# Patient Record
(demographics unavailable — no encounter records)

---

## 2024-11-04 NOTE — PHYSICAL EXAM
[Acute Distress] : acute distress [Alert] : alert [Toxic appearing] : well appearing [Moves all extremities x4] : moves all extremities x4 [Warm, well perfused x4] : warm, well perfused x4 [Capillary refill < 2s] : Capillary refill < 2s [NL] : grossly intact [TextBox_37] : Soft, non-tender, non-distended, with positive bowel sounds.  There are no masses and no organomegaly.  Left groin- area with  black shawn is darker than surrounding area but not really raised and not infected nor inflamed. Some induration but no formal distinct lesion nor cyst is noted. Induration is probably post infection. No leg issue and lesion is high on the inguinal ligament. Nothing on left groin and shoddy adenopathy.bilateral groins.

## 2024-11-04 NOTE — CONSULT LETTER
[Dear  ___] : Dear  [unfilled], [Please see my note below.] : Please see my note below. [FreeTextEntry1] : I had the pleasure of seeing YULI QUARLES in my office on Nov 04, 2024 Thank you very much for letting me participate in YULI QUARLES 's care and I will keep you informed of his progress. Sincerely, Mendel Patel M.D.

## 2024-11-04 NOTE — HISTORY OF PRESENT ILLNESS
[FreeTextEntry1] : Carlos A Sr is a 1 y/o male with a cc/ of a left groin abscess now healing that his pediatrician wanted to have evaluated.  Pt had a left groin abscess appear around a month ago which was treated with antibiotics and had spontaneously drained.  Now much better with no pain, inflammation, but still a little swelling and induration.  The question of an underlying lesion was raised, and he is here for an evaluation.

## 2024-11-04 NOTE — HISTORY OF PRESENT ILLNESS
[FreeTextEntry1] : Carlos A Sr is a 3 y/o male with a cc/ of a left groin abscess now healing that his pediatrician wanted to have evaluated.  Pt had a left groin abscess appear around a month ago which was treated with antibiotics and had spontaneously drained.  Now much better with no pain, inflammation, but still a little swelling and induration.  The question of an underlying lesion was raised, and he is here for an evaluation.

## 2024-11-04 NOTE — ASSESSMENT
[FreeTextEntry1] : Overall, Carlos A is a 1 y/o male with a cc/ of a resolving left groin abscess that only has on exam induration and no distinct cyst nor recurrent abscess.  I believe this process to be self-limiting and not need any kind of follow up nor does it have a high risk of recurrence.  I do not think any further surgical intervention is necessary.  He may return to the office as needed.

## 2024-11-04 NOTE — REASON FOR VISIT
[Initial - Scheduled] : an initial, scheduled visit with concerns of [Parents] : parents [FreeTextEntry3] : left groin abscess [FreeTextEntry4] : SALLY FRANKLIN